# Patient Record
Sex: FEMALE | Race: BLACK OR AFRICAN AMERICAN | NOT HISPANIC OR LATINO | URBAN - METROPOLITAN AREA
[De-identification: names, ages, dates, MRNs, and addresses within clinical notes are randomized per-mention and may not be internally consistent; named-entity substitution may affect disease eponyms.]

---

## 2018-10-01 ENCOUNTER — OUTPATIENT (OUTPATIENT)
Dept: OUTPATIENT SERVICES | Facility: HOSPITAL | Age: 33
LOS: 1 days | End: 2018-10-01
Payer: MEDICAID

## 2018-10-01 PROCEDURE — G9001: CPT

## 2018-10-12 ENCOUNTER — APPOINTMENT (OUTPATIENT)
Dept: OBGYN | Facility: CLINIC | Age: 33
End: 2018-10-12
Payer: MEDICAID

## 2018-10-12 VITALS
SYSTOLIC BLOOD PRESSURE: 112 MMHG | HEIGHT: 67 IN | BODY MASS INDEX: 41.75 KG/M2 | WEIGHT: 266 LBS | DIASTOLIC BLOOD PRESSURE: 76 MMHG

## 2018-10-12 DIAGNOSIS — Z01.419 ENCOUNTER FOR GYNECOLOGICAL EXAMINATION (GENERAL) (ROUTINE) W/OUT ABNORMAL FINDINGS: ICD-10-CM

## 2018-10-12 PROCEDURE — 99395 PREV VISIT EST AGE 18-39: CPT

## 2018-10-15 LAB
ALBUMIN SERPL ELPH-MCNC: 4.1 G/DL
ALP BLD-CCNC: 62 U/L
ALT SERPL-CCNC: 20 U/L
ANION GAP SERPL CALC-SCNC: 9 MMOL/L
AST SERPL-CCNC: 27 U/L
BASOPHILS # BLD AUTO: 0.02 K/UL
BASOPHILS NFR BLD AUTO: 0.5 %
BILIRUB SERPL-MCNC: 0.3 MG/DL
BUN SERPL-MCNC: 4 MG/DL
C TRACH RRNA SPEC QL NAA+PROBE: NOT DETECTED
CALCIUM SERPL-MCNC: 9 MG/DL
CHLORIDE SERPL-SCNC: 103 MMOL/L
CO2 SERPL-SCNC: 25 MMOL/L
CREAT SERPL-MCNC: 0.72 MG/DL
DHEA-S SERPL-MCNC: 107 UG/DL
EOSINOPHIL # BLD AUTO: 0.23 K/UL
EOSINOPHIL NFR BLD AUTO: 5.2 %
ESTRADIOL SERPL-MCNC: 203 PG/ML
FSH SERPL-MCNC: 2.1 IU/L
GLUCOSE SERPL-MCNC: 88 MG/DL
HBA1C MFR BLD HPLC: 5.7 %
HBV SURFACE AG SER QL: NONREACTIVE
HCT VFR BLD CALC: 37.5 %
HCV AB SER QL: NONREACTIVE
HCV S/CO RATIO: 0.06 S/CO
HGB BLD-MCNC: 11.9 G/DL
HIV1+2 AB SPEC QL IA.RAPID: NONREACTIVE
HPV HIGH+LOW RISK DNA PNL CVX: NOT DETECTED
HSV 1+2 IGG SER IA-IMP: NEGATIVE
HSV 1+2 IGG SER IA-IMP: POSITIVE
HSV1 IGG SER QL: 31.1 INDEX
HSV2 IGG SER QL: 0.11 INDEX
IMM GRANULOCYTES NFR BLD AUTO: 0.2 %
INSULIN SERPL-MCNC: 27.6 UU/ML
LH SERPL-ACNC: 7.2 IU/L
LYMPHOCYTES # BLD AUTO: 1.59 K/UL
LYMPHOCYTES NFR BLD AUTO: 36.1 %
MAN DIFF?: NORMAL
MCHC RBC-ENTMCNC: 25.1 PG
MCHC RBC-ENTMCNC: 31.7 GM/DL
MCV RBC AUTO: 78.9 FL
MONOCYTES # BLD AUTO: 0.49 K/UL
MONOCYTES NFR BLD AUTO: 11.1 %
N GONORRHOEA RRNA SPEC QL NAA+PROBE: NOT DETECTED
NEUTROPHILS # BLD AUTO: 2.07 K/UL
NEUTROPHILS NFR BLD AUTO: 46.9 %
PLATELET # BLD AUTO: 313 K/UL
POTASSIUM SERPL-SCNC: 4.2 MMOL/L
PROGEST SERPL-MCNC: 1.1 NG/ML
PROLACTIN SERPL-MCNC: 31.2 NG/ML
PROT SERPL-MCNC: 7.9 G/DL
RBC # BLD: 4.75 M/UL
RBC # FLD: 15.6 %
SODIUM SERPL-SCNC: 137 MMOL/L
SOURCE AMPLIFICATION: NORMAL
SOURCE TP AMPLIFICATION: NORMAL
T PALLIDUM AB SER QL IA: NEGATIVE
T VAGINALIS RRNA SPEC QL NAA+PROBE: NOT DETECTED
T4 FREE SERPL-MCNC: 1.2 NG/DL
TESTOST SERPL-MCNC: 35.6 NG/DL
TSH SERPL-ACNC: 1.94 UIU/ML
WBC # FLD AUTO: 4.41 K/UL

## 2018-10-16 LAB
HSV1 IGM SER QL: NORMAL TITER
HSV2 AB FLD-ACNC: NORMAL TITER

## 2018-10-18 ENCOUNTER — EMERGENCY (EMERGENCY)
Facility: HOSPITAL | Age: 33
LOS: 1 days | Discharge: HOME | End: 2018-10-18
Attending: EMERGENCY MEDICINE | Admitting: EMERGENCY MEDICINE
Payer: MEDICAID

## 2018-10-18 VITALS
RESPIRATION RATE: 20 BRPM | HEART RATE: 84 BPM | TEMPERATURE: 98 F | SYSTOLIC BLOOD PRESSURE: 115 MMHG | DIASTOLIC BLOOD PRESSURE: 69 MMHG | OXYGEN SATURATION: 98 %

## 2018-10-18 VITALS
OXYGEN SATURATION: 100 % | HEART RATE: 77 BPM | SYSTOLIC BLOOD PRESSURE: 109 MMHG | RESPIRATION RATE: 16 BRPM | DIASTOLIC BLOOD PRESSURE: 79 MMHG | TEMPERATURE: 98 F

## 2018-10-18 LAB — CYTOLOGY CVX/VAG DOC THIN PREP: NORMAL

## 2018-10-18 PROCEDURE — 99284 EMERGENCY DEPT VISIT MOD MDM: CPT

## 2018-10-18 NOTE — ED ADULT NURSE NOTE - NSIMPLEMENTINTERV_GEN_ALL_ED
Implemented All Universal Safety Interventions:  [place] to call system. Call bell, personal items and telephone within reach. Instruct patient to call for assistance. Room bathroom lighting operational. Non-slip footwear when patient is off stretcher. Physically safe environment: no spills, clutter or unnecessary equipment. Stretcher in lowest position, wheels locked, appropriate side rails in place.

## 2018-10-18 NOTE — ED ADULT TRIAGE NOTE - CHIEF COMPLAINT QUOTE
Pt c/o dry cough, sinus congestion/rhinitis, sob, wheezing, symptoms on and off x several weeks. No hx Asthma. States was rx'd a ZPack for Pharyngitis x 1 mo ago. Afebrile at present. PMHx PCOS and Epilepsy as a child (not on meds).  Insp/Exp Wheezes auscultated bilat.

## 2018-10-18 NOTE — ED PROVIDER NOTE - NS ED ROS FT
Constitutional: no fevers, no chills.  Eyes: no visual changes.  Ears: no ear drainage, no ear pain.  Nose: +nasal congestion.  Mouth/Throat: no sore throat.  Cardiovascular: no chest pain.  Respiratory: +shortness of breath, +wheezing, +cough  Gastrointestinal: no nausea, no vomiting, no diarrhea, no abdominal pain.  MSK: no flank pain, no back pain.  Genitourinary: no dysuria, no hematuria.  Skin: no rashes.  Neuro: +headache, +dizziness  Psychiatric: no known mental health issues.  '

## 2018-10-18 NOTE — ED PROVIDER NOTE - PHYSICAL EXAMINATION
GEN: Well appearing, well nourished, in no apparent distress.  HEAD: NCAT  HEENT: PERRL, EOMI, no nystagmus, Airway patent, uvula midline, MMM, neck supple, no LAD, no JVD  LUNG: CTAB, no adventitious sounds, no retractions, no nasal flaring  CV: RRR, no murmurs,   Abd: soft, NTND, no rebound or guarding, BS+ in all quadrants, no CVAT  MSK: WWP, Pulses 2+ in extremities, trace b/l pedal edema at ankles, no visible deformities, strength 5/5 in all extremities,   Neuro:  CN II-XII in tact. AAOx3, Ambulatory with stable gait. sensations in tact in all extremities,  neg pronator drift, neg finger to nose, Tremors apparent at rest and with movement mainly in L UE  Skin: Warm and dry, no evidence of rash  Psych: normal mood and affect

## 2018-10-18 NOTE — ED PROVIDER NOTE - ATTENDING CONTRIBUTION TO CARE
AJM: Patient seen with resident and agree with above note. 32F hx pcos on birth control and essential tremors, seizures presents with 1 month of "cold/flu-like symptoms". Originally had cough with nasal congestion. WEnt to urgent care and was prescribed 5 days of z-pack with no improvement. Pt actually endorses a couple of days of worsening sob, wheezing, nausea, fatigue and chills. She also has CP, mild HA and sore throat assoc with coughing. Denies abdominal pain, diarrhea, dysuria, recent travel, recent hospitalization, conjunctivitis. given chronic nature of symptoms and normal VS, likely allergic component. will obtain cxr. if normal will dc home with pmd follow up, xyrtec, flonase. pt and family comfortable with plan.

## 2018-10-18 NOTE — ED ADULT NURSE NOTE - OBJECTIVE STATEMENT
Pt received into room 14 A&Ox4, ambulatory C/O dry cough, sinus congestion/rhinitis, sob, wheezing, symptoms on and off x several weeks. Pt States was prescribed a ZPack for Pharyngitis x 1 mo ago. Breath sounds clear at this time, Pt speaking in full sentences without difficulty, respiration even and unlabored. Md evaluated, VS as noted. Awaiting orders at this time, will continue to monitor for safety and comfort.

## 2018-10-18 NOTE — ED PROVIDER NOTE - OBJECTIVE STATEMENT
32F hx pcos on birth control and essential tremors, seizures presents with 1 month of "cold/flu-like symptoms". Originally had cough with nasal congestion. WEnt to urgent care and was prescribed 5 days of z-pack with no improvement. Pt actually endorses a couple of days of worsening sob, wheezing, nausea, fatigue and chills. She also has CP, mild HA and sore throat assoc with coughing. Denies abdominal pain, diarrhea, dysuria, recent travel, recent hospitalization, conjunctivitis.

## 2018-10-18 NOTE — ED PROVIDER NOTE - MEDICAL DECISION MAKING DETAILS
1 month of cough, r/o pna with cxr. pt does not look toxic so unlikely septic. most likely walking pneumonia vs bronchitis. neuro exam unimpressive for any red flags. 1 month of cough, r/o pna with cxr. pt does not look toxic so unlikely septic. If cxr neg, will send home with flonase and zyrtec as likely seasonal allergies is the cause of her dry cough with post nasal drip, with sinus congestion and HA. neuro exam unimpressive for any red flags.

## 2018-10-18 NOTE — ED PROVIDER NOTE - NSCAREINITIATED _GEN_ER
Problem: Patient Care Overview  Goal: Plan of Care/Patient Progress Review  Outcome: Therapy, progress towards functional goals is fair  Pts VS remain stable on 1/32 off the wall. She remains on IDF bottling for 25 and 17. Brief desats during feedings, self recovered with some pacing. Infant tolerating feeds. Voiding, no stool. Parents not present during shift. Will continue to monitor pt for changes in condition and notify provider as needed.        Zachary Muhammad(Attending)

## 2018-10-19 PROCEDURE — 71046 X-RAY EXAM CHEST 2 VIEWS: CPT | Mod: 26

## 2018-10-22 DIAGNOSIS — Z71.89 OTHER SPECIFIED COUNSELING: ICD-10-CM

## 2018-11-09 ENCOUNTER — APPOINTMENT (OUTPATIENT)
Dept: OBGYN | Facility: CLINIC | Age: 33
End: 2018-11-09
Payer: MEDICAID

## 2018-11-09 VITALS
WEIGHT: 259 LBS | HEIGHT: 67 IN | SYSTOLIC BLOOD PRESSURE: 124 MMHG | DIASTOLIC BLOOD PRESSURE: 70 MMHG | BODY MASS INDEX: 40.65 KG/M2

## 2018-11-09 PROCEDURE — 99213 OFFICE O/P EST LOW 20 MIN: CPT

## 2018-11-10 LAB
ALBUMIN SERPL ELPH-MCNC: 4 G/DL
ALP BLD-CCNC: 49 U/L
ALT SERPL-CCNC: 17 U/L
ANION GAP SERPL CALC-SCNC: 10 MMOL/L
AST SERPL-CCNC: 20 U/L
BILIRUB SERPL-MCNC: 0.3 MG/DL
BUN SERPL-MCNC: 7 MG/DL
CALCIUM SERPL-MCNC: 8.8 MG/DL
CHLORIDE SERPL-SCNC: 108 MMOL/L
CO2 SERPL-SCNC: 22 MMOL/L
CREAT SERPL-MCNC: 0.67 MG/DL
GLUCOSE SERPL-MCNC: 86 MG/DL
POTASSIUM SERPL-SCNC: 4.3 MMOL/L
PROLACTIN SERPL-MCNC: 18.8 NG/ML
PROT SERPL-MCNC: 7.2 G/DL
SODIUM SERPL-SCNC: 140 MMOL/L

## 2018-12-03 ENCOUNTER — OTHER (OUTPATIENT)
Age: 33
End: 2018-12-03

## 2018-12-14 ENCOUNTER — APPOINTMENT (OUTPATIENT)
Dept: OBGYN | Facility: CLINIC | Age: 33
End: 2018-12-14
Payer: MEDICAID

## 2018-12-14 VITALS
HEIGHT: 67 IN | SYSTOLIC BLOOD PRESSURE: 122 MMHG | WEIGHT: 257 LBS | DIASTOLIC BLOOD PRESSURE: 84 MMHG | BODY MASS INDEX: 40.34 KG/M2

## 2018-12-14 VITALS
BODY MASS INDEX: 26.68 KG/M2 | DIASTOLIC BLOOD PRESSURE: 70 MMHG | HEIGHT: 67 IN | SYSTOLIC BLOOD PRESSURE: 120 MMHG | WEIGHT: 170 LBS

## 2018-12-14 DIAGNOSIS — E28.2 POLYCYSTIC OVARIAN SYNDROME: ICD-10-CM

## 2018-12-14 DIAGNOSIS — N92.6 IRREGULAR MENSTRUATION, UNSPECIFIED: ICD-10-CM

## 2018-12-14 DIAGNOSIS — L68.9 HYPERTRICHOSIS, UNSPECIFIED: ICD-10-CM

## 2018-12-14 LAB
HCG UR QL: NEGATIVE
QUALITY CONTROL: YES

## 2018-12-14 PROCEDURE — 99213 OFFICE O/P EST LOW 20 MIN: CPT

## 2018-12-14 PROCEDURE — 81025 URINE PREGNANCY TEST: CPT

## 2018-12-14 RX ORDER — METFORMIN HYDROCHLORIDE 500 MG/1
500 TABLET, COATED ORAL
Qty: 60 | Refills: 0 | Status: ACTIVE | COMMUNITY
Start: 2018-12-14 | End: 1900-01-01

## 2018-12-14 RX ORDER — NORGESTREL AND ETHINYL ESTRADIOL 0.3-0.03MG
0.3-3 KIT ORAL DAILY
Qty: 28 | Refills: 5 | Status: DISCONTINUED | COMMUNITY
Start: 2018-10-12 | End: 2018-12-14

## 2018-12-14 RX ORDER — MEDROXYPROGESTERONE ACETATE 10 MG/1
10 TABLET ORAL DAILY
Qty: 7 | Refills: 0 | Status: ACTIVE | COMMUNITY
Start: 2018-12-14 | End: 1900-01-01

## 2019-01-02 ENCOUNTER — FORM ENCOUNTER (OUTPATIENT)
Age: 34
End: 2019-01-02

## 2024-05-28 ENCOUNTER — INPATIENT (INPATIENT)
Facility: HOSPITAL | Age: 39
LOS: 2 days | Discharge: HOME | End: 2024-05-31
Attending: INTERNAL MEDICINE | Admitting: INTERNAL MEDICINE
Payer: COMMERCIAL

## 2024-05-28 VITALS
DIASTOLIC BLOOD PRESSURE: 77 MMHG | WEIGHT: 205.03 LBS | HEART RATE: 73 BPM | SYSTOLIC BLOOD PRESSURE: 112 MMHG | OXYGEN SATURATION: 100 % | TEMPERATURE: 99 F | RESPIRATION RATE: 22 BRPM | HEIGHT: 66 IN

## 2024-05-28 LAB
BASOPHILS # BLD AUTO: 0.06 K/UL — SIGNIFICANT CHANGE UP (ref 0–0.2)
BASOPHILS NFR BLD AUTO: 0.9 % — SIGNIFICANT CHANGE UP (ref 0–2)
EOSINOPHIL # BLD AUTO: 0.12 K/UL — SIGNIFICANT CHANGE UP (ref 0–0.5)
EOSINOPHIL NFR BLD AUTO: 1.8 % — SIGNIFICANT CHANGE UP (ref 0–6)
HCT VFR BLD CALC: 37.1 % — SIGNIFICANT CHANGE UP (ref 34.5–45)
HGB BLD-MCNC: 12.3 G/DL — SIGNIFICANT CHANGE UP (ref 11.5–15.5)
IMM GRANULOCYTES NFR BLD AUTO: 0.2 % — SIGNIFICANT CHANGE UP (ref 0–0.9)
LYMPHOCYTES # BLD AUTO: 2.11 K/UL — SIGNIFICANT CHANGE UP (ref 1–3.3)
LYMPHOCYTES # BLD AUTO: 32.2 % — SIGNIFICANT CHANGE UP (ref 13–44)
MCHC RBC-ENTMCNC: 27.6 PG — SIGNIFICANT CHANGE UP (ref 27–34)
MCHC RBC-ENTMCNC: 33.2 G/DL — SIGNIFICANT CHANGE UP (ref 32–36)
MCV RBC AUTO: 83.2 FL — SIGNIFICANT CHANGE UP (ref 80–100)
MONOCYTES # BLD AUTO: 0.55 K/UL — SIGNIFICANT CHANGE UP (ref 0–0.9)
MONOCYTES NFR BLD AUTO: 8.4 % — SIGNIFICANT CHANGE UP (ref 2–14)
NEUTROPHILS # BLD AUTO: 3.71 K/UL — SIGNIFICANT CHANGE UP (ref 1.8–7.4)
NEUTROPHILS NFR BLD AUTO: 56.5 % — SIGNIFICANT CHANGE UP (ref 43–77)
NRBC # BLD: 0 /100 WBCS — SIGNIFICANT CHANGE UP (ref 0–0)
PLATELET # BLD AUTO: 214 K/UL — SIGNIFICANT CHANGE UP (ref 150–400)
RBC # BLD: 4.46 M/UL — SIGNIFICANT CHANGE UP (ref 3.8–5.2)
RBC # FLD: 14.3 % — SIGNIFICANT CHANGE UP (ref 10.3–14.5)
WBC # BLD: 6.56 K/UL — SIGNIFICANT CHANGE UP (ref 3.8–10.5)
WBC # FLD AUTO: 6.56 K/UL — SIGNIFICANT CHANGE UP (ref 3.8–10.5)

## 2024-05-28 PROCEDURE — 99285 EMERGENCY DEPT VISIT HI MDM: CPT

## 2024-05-28 RX ORDER — SODIUM CHLORIDE 9 MG/ML
1000 INJECTION INTRAMUSCULAR; INTRAVENOUS; SUBCUTANEOUS ONCE
Refills: 0 | Status: COMPLETED | OUTPATIENT
Start: 2024-05-28 | End: 2024-05-28

## 2024-05-28 RX ORDER — ACETAMINOPHEN 500 MG
975 TABLET ORAL ONCE
Refills: 0 | Status: COMPLETED | OUTPATIENT
Start: 2024-05-28 | End: 2024-05-28

## 2024-05-28 RX ORDER — METOCLOPRAMIDE HCL 10 MG
10 TABLET ORAL ONCE
Refills: 0 | Status: COMPLETED | OUTPATIENT
Start: 2024-05-28 | End: 2024-05-28

## 2024-05-28 RX ADMIN — Medication 10 MILLIGRAM(S): at 23:21

## 2024-05-28 RX ADMIN — Medication 975 MILLIGRAM(S): at 23:51

## 2024-05-28 RX ADMIN — Medication 975 MILLIGRAM(S): at 23:21

## 2024-05-28 RX ADMIN — SODIUM CHLORIDE 2000 MILLILITER(S): 9 INJECTION INTRAMUSCULAR; INTRAVENOUS; SUBCUTANEOUS at 23:21

## 2024-05-28 NOTE — ED ADULT NURSE NOTE - CAS TRG GENERAL AIRWAY, MLM
Encounter addended by: Mallory Velez [facility] [place] on: 2/19/2020 12:42 PM   Actions taken: Clinical Note Signed Constitutional: No fever or chills.   Eyes: No pain, blurry vision, or discharge.  ENMT: No hearing changes, pain, discharge or infections. No neck pain or stiffness.  Cardiac: See HPI  Respiratory: No hemoptysis. No history of asthma or RAD.  GI: No nausea, vomiting, diarrhea or abdominal pain.  : No dysuria, frequency or burning.  MS: No myalgia, muscle weakness, joint pain or back pain.  Neuro: No headache or weakness. No LOC.  Skin: No skin rash.   Endocrine: No history of thyroid disease or diabetes.  Except as documented in the HPI, all other systems are negative. Patent

## 2024-05-28 NOTE — ED ADULT TRIAGE NOTE - PATIENT ON (OXYGEN DELIVERY METHOD)
Patient Specific Counseling (Will Not Stick From Patient To Patient): \\n  Discussed with patient the benign nature of these plaques, given cosmetic concern, information given to [facility] office for consult for laser treatment. Detail Level: Simple room air

## 2024-05-28 NOTE — ED ADULT TRIAGE NOTE - TEMPERATURE IN CELSIUS (DEGREES C)
HC called patients to schedule wellness appointments. Will start scheduling appointments during January.   37.1

## 2024-05-28 NOTE — ED ADULT NURSE NOTE - ED STAT RN HANDOFF DETAILS
[phone] Report endorsed to Ching VANEGAS. Safety checks completed this shift. IV sites checked and remains WDL. Meds given as ordered with no s/s of adverse RXNs. Fall +skin precs in place. Any issues endorsed to oncoming RN for follow up.

## 2024-05-28 NOTE — ED ADULT NURSE NOTE - OBJECTIVE STATEMENT
Pt states that has had headaches for the past 3 months, seen by primary, tried firocet with no relief of headache. PMH of PCOS and anemia. Also endorsing that she passed out 2 weeks ago. Denies fevers, chills, N/V, abdominal pain.

## 2024-05-28 NOTE — ED ADULT TRIAGE NOTE - CHIEF COMPLAINT QUOTE
as per patient " continuous headaches, with blurred vision, dizziness, fainted twice and passed out once in a super market x 1 month, today worsen headache traveling to left ear/left jaw, bilateral hands shaky with nausea." [ hx seizure, PCOS]

## 2024-05-29 LAB
ALBUMIN SERPL ELPH-MCNC: 3.3 G/DL — SIGNIFICANT CHANGE UP (ref 3.3–5)
ALP SERPL-CCNC: 48 U/L — SIGNIFICANT CHANGE UP (ref 40–120)
ALT FLD-CCNC: 15 U/L — SIGNIFICANT CHANGE UP (ref 12–78)
ANION GAP SERPL CALC-SCNC: 7 MMOL/L — SIGNIFICANT CHANGE UP (ref 5–17)
AST SERPL-CCNC: 10 U/L — LOW (ref 15–37)
BILIRUB SERPL-MCNC: 0.1 MG/DL — LOW (ref 0.2–1.2)
BUN SERPL-MCNC: 10 MG/DL — SIGNIFICANT CHANGE UP (ref 7–23)
CALCIUM SERPL-MCNC: 8.6 MG/DL — SIGNIFICANT CHANGE UP (ref 8.5–10.1)
CHLORIDE SERPL-SCNC: 110 MMOL/L — HIGH (ref 96–108)
CO2 SERPL-SCNC: 23 MMOL/L — SIGNIFICANT CHANGE UP (ref 22–31)
CREAT SERPL-MCNC: 0.71 MG/DL — SIGNIFICANT CHANGE UP (ref 0.5–1.3)
EGFR: 112 ML/MIN/1.73M2 — SIGNIFICANT CHANGE UP
GLUCOSE SERPL-MCNC: 91 MG/DL — SIGNIFICANT CHANGE UP (ref 70–99)
HCG SERPL-ACNC: <1 MIU/ML — SIGNIFICANT CHANGE UP
LACTATE SERPL-SCNC: 0.5 MMOL/L — LOW (ref 0.7–2)
MAGNESIUM SERPL-MCNC: 2.1 MG/DL — SIGNIFICANT CHANGE UP (ref 1.6–2.6)
PHOSPHATE SERPL-MCNC: 4.3 MG/DL — SIGNIFICANT CHANGE UP (ref 2.5–4.5)
POTASSIUM SERPL-MCNC: 3.9 MMOL/L — SIGNIFICANT CHANGE UP (ref 3.5–5.3)
POTASSIUM SERPL-SCNC: 3.9 MMOL/L — SIGNIFICANT CHANGE UP (ref 3.5–5.3)
PROT SERPL-MCNC: 7.3 GM/DL — SIGNIFICANT CHANGE UP (ref 6–8.3)
SODIUM SERPL-SCNC: 140 MMOL/L — SIGNIFICANT CHANGE UP (ref 135–145)

## 2024-05-29 PROCEDURE — 70496 CT ANGIOGRAPHY HEAD: CPT | Mod: 26,MC

## 2024-05-29 PROCEDURE — 99222 1ST HOSP IP/OBS MODERATE 55: CPT

## 2024-05-29 PROCEDURE — 70450 CT HEAD/BRAIN W/O DYE: CPT | Mod: 26,59,MC

## 2024-05-29 PROCEDURE — 70498 CT ANGIOGRAPHY NECK: CPT | Mod: 26,MC

## 2024-05-29 RX ORDER — LANOLIN ALCOHOL/MO/W.PET/CERES
3 CREAM (GRAM) TOPICAL AT BEDTIME
Refills: 0 | Status: DISCONTINUED | OUTPATIENT
Start: 2024-05-29 | End: 2024-05-31

## 2024-05-29 RX ORDER — SUMATRIPTAN SUCCINATE 4 MG/.5ML
50 INJECTION, SOLUTION SUBCUTANEOUS ONCE
Refills: 0 | Status: DISCONTINUED | OUTPATIENT
Start: 2024-05-29 | End: 2024-05-29

## 2024-05-29 RX ORDER — KETOROLAC TROMETHAMINE 30 MG/ML
15 SYRINGE (ML) INJECTION EVERY 6 HOURS
Refills: 0 | Status: DISCONTINUED | OUTPATIENT
Start: 2024-05-29 | End: 2024-05-31

## 2024-05-29 RX ORDER — ACETAMINOPHEN 500 MG
650 TABLET ORAL EVERY 6 HOURS
Refills: 0 | Status: DISCONTINUED | OUTPATIENT
Start: 2024-05-29 | End: 2024-05-31

## 2024-05-29 RX ORDER — KETOROLAC TROMETHAMINE 30 MG/ML
15 SYRINGE (ML) INJECTION ONCE
Refills: 0 | Status: DISCONTINUED | OUTPATIENT
Start: 2024-05-29 | End: 2024-05-29

## 2024-05-29 RX ORDER — KETOROLAC TROMETHAMINE 30 MG/ML
30 SYRINGE (ML) INJECTION ONCE
Refills: 0 | Status: DISCONTINUED | OUTPATIENT
Start: 2024-05-29 | End: 2024-05-31

## 2024-05-29 RX ORDER — ONDANSETRON 8 MG/1
4 TABLET, FILM COATED ORAL EVERY 8 HOURS
Refills: 0 | Status: DISCONTINUED | OUTPATIENT
Start: 2024-05-29 | End: 2024-05-31

## 2024-05-29 RX ORDER — DEXAMETHASONE 0.5 MG/5ML
6 ELIXIR ORAL ONCE
Refills: 0 | Status: DISCONTINUED | OUTPATIENT
Start: 2024-05-29 | End: 2024-05-30

## 2024-05-29 RX ORDER — CYCLOBENZAPRINE HYDROCHLORIDE 10 MG/1
5 TABLET, FILM COATED ORAL THREE TIMES A DAY
Refills: 0 | Status: DISCONTINUED | OUTPATIENT
Start: 2024-05-29 | End: 2024-05-31

## 2024-05-29 RX ORDER — MAGNESIUM SULFATE 500 MG/ML
1 VIAL (ML) INJECTION ONCE
Refills: 0 | Status: COMPLETED | OUTPATIENT
Start: 2024-05-29 | End: 2024-05-29

## 2024-05-29 RX ADMIN — Medication 15 MILLIGRAM(S): at 20:43

## 2024-05-29 RX ADMIN — Medication 15 MILLIGRAM(S): at 21:40

## 2024-05-29 RX ADMIN — Medication 15 MILLIGRAM(S): at 04:00

## 2024-05-29 RX ADMIN — Medication 100 GRAM(S): at 20:55

## 2024-05-29 RX ADMIN — Medication 1 TABLET(S): at 04:08

## 2024-05-29 RX ADMIN — Medication 15 MILLIGRAM(S): at 05:37

## 2024-05-29 NOTE — PATIENT PROFILE ADULT - FALL HARM RISK - UNIVERSAL INTERVENTIONS
Bed in lowest position, wheels locked, appropriate side rails in place/Call bell, personal items and telephone in reach/Instruct patient to call for assistance before getting out of bed or chair/Non-slip footwear when patient is out of bed/[place] to call system/Physically safe environment - no spills, clutter or unnecessary equipment/Purposeful Proactive Rounding/Room/bathroom lighting operational, light cord in reach

## 2024-05-29 NOTE — ED PROVIDER NOTE - CRANIAL NERVE AND PUPILLARY EXAM
normal finger to nose/cranial nerves 2-12 intact/central vision intact/extra-ocular movements intact

## 2024-05-29 NOTE — H&P ADULT - NSHPLABSRESULTS_GEN_ALL_CORE
12.3   6.56  )-----------( 214      ( 28 May 2024 23:30 )             37.1     05-28    140  |  110<H>  |  10  ----------------------------<  91  3.9   |  23  |  0.71    Ca    8.6      28 May 2024 23:30  Phos  4.3     05-28  Mg     2.1     05-28    TPro  7.3  /  Alb  3.3  /  TBili  0.1<L>  /  DBili  x   /  AST  10<L>  /  ALT  15  /  AlkPhos  48  05-28      Urinalysis Basic - ( 28 May 2024 23:30 )    Color: x / Appearance: x / SG: x / pH: x  Gluc: 91 mg/dL / Ketone: x  / Bili: x / Urobili: x   Blood: x / Protein: x / Nitrite: x   Leuk Esterase: x / RBC: x / WBC x   Sq Epi: x / Non Sq Epi: x / Bacteria: x      < from: CT Head No Cont (05.29.24 @ 01:47) >    IMPRESSION:  CT Head: No acute intracranial hemorrhage or mass effect.    CTA Neck: No hemodynamically significant stenosis, dissection, or   pseudoaneurysm.    CTA Head: No large vessel occlusion or flow-limiting stenosis.    --- End of Report ---    < end of copied text >

## 2024-05-29 NOTE — H&P ADULT - HISTORY OF PRESENT ILLNESS
38 year old woman hx of seizure disorder (no longer on medications) and PCOS who presents to the c/o persistent unrelieved headache x 1 month. Headaches often accompanied by changes in vision/lightheadedness, described as 10/10 frontal. radiating to the left head, left side of neck and back of head and left earNot relieved by otc meds. Seen by her PMD and had blood work and referred for CT scan.  Patient reports outpatient CT scan was negative.

## 2024-05-29 NOTE — ED PROVIDER NOTE - OBJECTIVE STATEMENT
This patient is a 38 year old woman hx of seizure disorder (no longer on medications) and PCOS who presents to the ER c/o persistent unrelieved headache x 1 month.  Although triage note reports migraine patient has no history of migraines and has never had this kind of pain in the past.  She denies recent head injury or falls.  Patient reports constant frontal headache associated with intermittent episodes of lightheadedness and blurry vision when the pain acutely fluctuates.  She has been taking tylenol and ibuprofen for pain.  She was seen by her PMD and had blood work and referred for CT scan.  Patient reports outpatient CT scan was negative.  She went back to her PCP last week for her results and was instructed to take excedrine with caffeine.  Patient states that her symptoms still have not improved and she still sufferes with constant daily headaches that feels like pressure.  The headache is now reports for the past day headache radiating to the left head, left side of neck and back of head and left ear feeling like "squeezing" pain 10/10 in severity.  She denies vision loss, fever, neck stiffness, focal weakness.   Patient reports 2 episodes of feeling faint and one episode of syncope within this month.  The symptoms would occur when the pain intensity spikes.  She denies chest pain, SOB, and palpitations.  She does report that for the couple weeks she has had intermittent left arm tingling.  No motor deficits.  Patient reports remote history of seizure since childhood that improved as a teenager.  She has not had a seizure or been on medication for about 20 years.

## 2024-05-29 NOTE — ED PROVIDER NOTE - CLINICAL SUMMARY MEDICAL DECISION MAKING FREE TEXT BOX
Persistent unrelenting headache x 1 month now radiating from frontal to left head x 1-2 days.  Patient neurologically intact.  No hx of migraines hx of remote seizures in the past.  Possible tension headache or atypical migraine. R/O Space occupying lesion.  Low suspicion for CVA.  Possible seizures?  Will give medication for pain, check labs, CT and CTA and Re-eval.  Consider admission if pain not controlled. Persistent unrelenting headache x 1 month now radiating from frontal to left head x 1-2 days.  Patient neurologically intact.  No hx of migraines hx of remote seizures in the past.  Possible tension headache or atypical migraine. R/O Space occupying lesion.  Low suspicion for CVA.  Possible seizures?  Will give medication for pain, check labs, CT and CTA and Re-eval.  Consider admission if pain not controlled.    CT/CTA negative for acute pathology.  Pain not controlled in the ER despite medication.  Patient admitted.

## 2024-05-29 NOTE — CONSULT NOTE ADULT - SUBJECTIVE AND OBJECTIVE BOX
Neurology consult    MANDIE [id number]     Patient is a 38y old  Female who presents with a chief complaint of intractable headache (29 May 2024 10:24)      HPI:  38 year old woman hx of seizure disorder (no longer on medications) and PCOS who presents to the c/o persistent unrelieved headache x 1 month. Headaches often accompanied by changes in vision/lightheadedness, described as 10/10 frontal. radiating to the left head, left side of neck and back of head and left earNot relieved by otc meds. Seen by her PMD and had blood work and referred for CT scan.  Patient reports outpatient CT scan was negative.  (29 May 2024 10:24)    + photophobia tingling down the left arm    no difficulty with language.  No vision loss or double vision.  No dizziness, vertigo or new hearing loss.  . No difficulty with swallowing.  No focal weakness.  No focal sensory changes.  No numbness or tingling in the bilateral lower extremities.  No difficulty with balance.  No difficulty with ambulation.    REVIEW OF SYSTEMS:    Constitutional: No fever, chills, fatigue, weakness  Eyes: no eye pain, visual disturbances, or discharge  ENT:  No difficulty hearing, tinnitus, vertigo; No sinus or throat pain  Neck: No pain or stiffness  Respiratory: No cough, dyspnea, wheezing   Cardiovascular: No chest pain, palpitations,   Gastrointestinal: No abdominal or epigastric pain. No nausea, vomiting  No diarrhea or constipation.   Genitourinary: No dysuria, frequency, hematuria or incontinence  Neurological: No headaches, lightheadedness, vertigo, numbness or tremors  Psychiatric: No depression, anxiety, mood swings or difficulty sleeping  Musculoskeletal: No joint pain or swelling; No muscle, back or extremity pain  Skin: No itching, burning, rashes or lesions   Lymph Nodes: No enlarged glands  Endocrine: No heat or cold intolerance; No hair loss, No h/o diabetes or thyroid dysfunction  Allergy and Immunologic: No hives or eczema    MEDICATIONS    acetaminophen     Tablet .. 650 milliGRAM(s) Oral every 6 hours PRN  aluminum hydroxide/magnesium hydroxide/simethicone Suspension 30 milliLiter(s) Oral every 4 hours PRN  melatonin 3 milliGRAM(s) Oral at bedtime PRN  ondansetron Injectable 4 milliGRAM(s) IV Push every 8 hours PRN  SUMAtriptan 50 milliGRAM(s) Oral once      PMH: PCOS (polycystic ovarian syndrome)    Seizure         PSH: No significant past surgical history        Family history: No history of dementia, strokes, or seizures   FAMILY HISTORY:  No pertinent family history in first degree relatives        SOCIAL HISTORY:  No history of tobacco or alcohol use     Allergies    No Known Drug Allergies  citrus (Anaphylaxis)    Intolerances        Height (cm): 167.6 (05-28 @ 21:42)  Weight (kg): 93 (05-28 @ 21:42)  BMI (kg/m2): 33.1 (05-28 @ 21:42)    Vital Signs Last 24 Hrs  T(C): 36.8 (29 May 2024 07:56), Max: 37.1 (28 May 2024 21:42)  T(F): 98.2 (29 May 2024 07:56), Max: 98.8 (28 May 2024 21:42)  HR: 68 (29 May 2024 07:56) (68 - 73)  BP: 100/67 (29 May 2024 07:56) (92/59 - 112/77)  BP(mean): 70 (29 May 2024 03:57) (70 - 82)  RR: 18 (29 May 2024 07:56) (14 - 22)  SpO2: 98% (29 May 2024 07:56) (98% - 100%)    Parameters below as of 29 May 2024 06:51  Patient On (Oxygen Delivery Method): room air          On Neurological Examination:    Mental Status - Patient is alert, awake, oriented X3. fluent, names, no dysarthria no aphasia Follows commands well and able to answer questions appropriately. Mood and affect  normal    Cranial Nerves - PERRL, EOMI, VFF, V1-V3 intact, no gross facial asymmetry, tongue/uvula midline    Motor Exam -   Right upper  Left upper  Right lower  Left lower      nml bulk/tone    Sensory    Intact to light touch and pinprick bilaterally    Coord: FTN intact bilaterally     Gait -  normal      Reflexes:          2+ throughout                                               GENERAL Exam:     Nontoxic , No Acute Distress   	  HEENT:  normocephalic, atraumatic  		  LUNGS:	Clear bilaterally  No Wheeze    	  HEART:	 Normal S1S2   No murmur RRR        	  GI/ ABDOMEN:  Soft  Non tender    EXTREMITIES:   No Edema  No Clubbing  No Cyanosis No Edema    MUSCULOSKELETAL: Normal Range of Motion  	   SKIN:      Normal   No Ecchymosis               LABS:  CBC Full  -  ( 28 May 2024 23:30 )  WBC Count : 6.56 K/uL  RBC Count : 4.46 M/uL  Hemoglobin : 12.3 g/dL  Hematocrit : 37.1 %  Platelet Count - Automated : 214 K/uL  Mean Cell Volume : 83.2 fl  Mean Cell Hemoglobin : 27.6 pg  Mean Cell Hemoglobin Concentration : 33.2 g/dL  Auto Neutrophil # : 3.71 K/uL  Auto Lymphocyte # : 2.11 K/uL  Auto Monocyte # : 0.55 K/uL  Auto Eosinophil # : 0.12 K/uL  Auto Basophil # : 0.06 K/uL  Auto Neutrophil % : 56.5 %  Auto Lymphocyte % : 32.2 %  Auto Monocyte % : 8.4 %  Auto Eosinophil % : 1.8 %  Auto Basophil % : 0.9 %    Urinalysis Basic - ( 28 May 2024 23:30 )    Color: x / Appearance: x / SG: x / pH: x  Gluc: 91 mg/dL / Ketone: x  / Bili: x / Urobili: x   Blood: x / Protein: x / Nitrite: x   Leuk Esterase: x / RBC: x / WBC x   Sq Epi: x / Non Sq Epi: x / Bacteria: x      05-28    140  |  110<H>  |  10  ----------------------------<  91  3.9   |  23  |  0.71    Ca    8.6      28 May 2024 23:30  Phos  4.3     05-28  Mg     2.1     05-28    TPro  7.3  /  Alb  3.3  /  TBili  0.1<L>  /  DBili  x   /  AST  10<L>  /  ALT  15  /  AlkPhos  48  05-28    LIVER FUNCTIONS - ( 28 May 2024 23:30 )  Alb: 3.3 g/dL / Pro: 7.3 gm/dL / ALK PHOS: 48 U/L / ALT: 15 U/L / AST: 10 U/L / GGT: x           Hemoglobin A1C:             RADIOLOGY  < from: CT Angio Neck w/ IV Cont (05.29.24 @ 01:50) >  IMPRESSION:  CT Head: No acute intracranial hemorrhage or mass effect.    CTA Neck: No hemodynamically significant stenosis, dissection, or   pseudoaneurysm.    CTA Head: No large vessel occlusion or flow-limiting stenosis.    --- End of Report ---      < end of copied text >

## 2024-05-29 NOTE — H&P ADULT - ASSESSMENT
38 year old woman hx of seizure disorder (no longer on medications) and PCOS who presents to the c/o persistent unrelieved headache x 1 month. Headaches often accompanied by changes in vision/lightheadedness, described as 10/10 frontal. radiating to the left head, left side of neck and back of head and left earNot relieved by otc meds. Seen by her PMD and had blood work and referred for CT scan.  Patient reports outpatient CT scan was negative.     Intractable headache  CT in ed negative  will get MRI  trial of triptan  Neuro consult placed

## 2024-05-29 NOTE — ED PROVIDER NOTE - CPE EDP EYES NORM
VSS. Pain well controlled with PRN pain medication. C/s dressing remains dry and intact. Pt ambulating independently and voiding without difficulty. Patient safety maintained, side rails up, bed low and locked position.  Patient responding to infant cues. Significant other at bedside and assisting in patient's care. Will continue to round as needed.   normal...

## 2024-05-29 NOTE — H&P ADULT - NSHPREVIEWOFSYSTEMS_GEN_ALL_CORE
CONSTITUTIONAL: No fever, weight loss, or fatigue  EYES: No eye pain, visual disturbances, or discharge  ENMT:  No difficulty hearing, tinnitus, vertigo; No sinus or throat pain  NECK: No pain or stiffness  BREASTS: No pain, masses, or nipple discharge  RESPIRATORY: No cough, wheezing, chills or hemoptysis; No shortness of breath  CARDIOVASCULAR: No chest pain, palpitations, dizziness, or leg swelling  GASTROINTESTINAL: No abdominal or epigastric pain. No nausea, vomiting, or hematemesis; No diarrhea or constipation. No melena or hematochezia.  GENITOURINARY: No dysuria, frequency, hematuria, or incontinence  NEUROLOGICAL: see hpi  SKIN: No itching, burning, rashes, or lesions   LYMPH NODES: No enlarged glands  ENDOCRINE: No heat or cold intolerance; No hair loss  MUSCULOSKELETAL: No joint pain or swelling; No muscle, back, or extremity pain  PSYCHIATRIC: No depression, anxiety, mood swings, or difficulty sleeping  HEME/LYMPH: No easy bruising, or bleeding gums  ALLERGY AND IMMUNOLOGIC: No hives or eczema

## 2024-05-29 NOTE — ED PROVIDER NOTE - CROS ED CONS ALL NEG
Chief Complaint   Patient presents with   • Altered Mental Status     hosptial encounter        Subjective   Tye JONES Junior is a 47 y.o. male.     History of Present Illness   Hospital follow up long and complicated course for this pt. Admitted 12/22/19-1/6/20 at which time he was d/c to PM&R for further treatment. Says he is feeling well home since 1/24/20.  Pt was not feeling well right before [place] initially attributed to his father dying. However when sister returned from the services noted her pt was not in his usually state of health in the way he appeared or behaved and he urinated on himself which was very uncharacteristic of pt so took him to ED. 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 He had surgery to remove and replace his  shunt during admission.   He also was seen by ID for determining the abx course and duration.  He also had status epilepticus in the hospital determined after placing him with EEG monitoring. Tried a different medications from his usual with increasing doses so now on new regimen here that is working. He was started on vimpat, clonazepam and topomax and the keppra was increased. Also on phenytoin.   Working on ADLs and now going to start with PT.  He had TARA thought to be related to abx use.  Keppra at 7 am, phenytoin then too this morning.   He is anxious to get back into his adult day program. He is getting around better at this time with the walker.   He does get restless at bedtime. Thinks the hydroxyzine helps to some extent with this. Has some urinary frequency but no longer having incontinence. This has resolved now that he is moving much better.      The following portions of the patient's history were reviewed and updated as appropriate: allergies, current medications, past medical history, past social history, past surgical history and problem  "list.    Review of Systems   Respiratory: Negative.    Cardiovascular: Negative.    Neurological: Negative.        /72 (BP Location: Left arm, Patient Position: Sitting, Cuff Size: Adult)   Pulse 87   Temp 97.9 °F (36.6 °C) (Oral)   Resp 13   Ht 182.9 cm (72\")   Wt 83.6 kg (184 lb 6.4 oz)   SpO2 98%   BMI 25.01 kg/m²       Objective   Physical Exam   Constitutional: He is oriented to person, place, and time. He appears well-nourished. No distress.   Eyes: Conjunctivae are normal. Right eye exhibits no discharge. Left eye exhibits no discharge. No scleral icterus.   Cardiovascular: Normal rate, regular rhythm, normal heart sounds and intact distal pulses. Exam reveals no gallop and no friction rub.   No murmur heard.  Pulmonary/Chest: Effort normal and breath sounds normal. No respiratory distress. He has no wheezes.   Musculoskeletal: He exhibits no edema.   Neurological: He is alert and oriented to person, place, and time.   Psychiatric: He has a normal mood and affect. His behavior is normal.   Vitals reviewed.      Assessment/Plan   Tye was seen today for altered mental status.    Diagnoses and all orders for this visit:    Seizure (CMS/HCC)  -     CBC & Differential  -     Comprehensive Metabolic Panel  -     Phenytoin Level, Total  -     Levetiracetam Level (Keppra)    History of traumatic brain injury  -     amphetamine-dextroamphetamine (ADDERALL) 30 MG tablet; Take 1 tablet by mouth Daily.    Medication management  -     CBC & Differential  -     Comprehensive Metabolic Panel  -     Phenytoin Level, Total  -     Levetiracetam Level (Keppra)    Other orders  -     potassium chloride (MICRO-K) 10 MEQ CR capsule; Take 1 capsule by mouth Daily.  -     hydrOXYzine pamoate (VISTARIL) 50 MG capsule; Take 1 capsule by mouth Every Night. For sleep  -     nystatin (MYCOSTATIN) 689090 UNIT/GM powder; Apply 1 application topically to the appropriate area as directed Every 12 (Twelve) Hours.      He is " negative... improving still after coming home. He is getting better on his feet  And getting stronger in his legs again. We discussed deconditioning with as much time as he spent in the hospital being down.   We will follow up on his anemia, platelet counts, kidney and liver labs here and he is set to see his other providers soon so will have the drug levels drawn at this time since he is here and getting stuck.    The hydroxyzine is helping at least some so we will continue.     Current outpatient and discharge medications have been reconciled for the patient.  Reviewed by: Jolene Laurent MD  Reviewed notes, labs and imaging reports..

## 2024-05-29 NOTE — H&P ADULT - NSHPPHYSICALEXAM_GEN_ALL_CORE
Vital Signs Last 24 Hrs  T(C): 36.8 (29 May 2024 07:56), Max: 37.1 (28 May 2024 21:42)  T(F): 98.2 (29 May 2024 07:56), Max: 98.8 (28 May 2024 21:42)  HR: 68 (29 May 2024 07:56) (68 - 73)  BP: 100/67 (29 May 2024 07:56) (92/59 - 112/77)  BP(mean): 70 (29 May 2024 03:57) (70 - 82)  RR: 18 (29 May 2024 07:56) (14 - 22)  SpO2: 98% (29 May 2024 07:56) (98% - 100%)    Parameters below as of 29 May 2024 06:51  Patient On (Oxygen Delivery Method): room air    GENERAL: NAD, well-groomed, well-developed  HEAD:  Atraumatic, Normocephalic  EYES: EOMI, PERRLA, conjunctiva and sclera clear  ENMT: No tonsillar erythema, exudates, or enlargement; Moist mucous membranes, Good dentition, No lesions  NECK: Supple, No JVD, Normal thyroid  NERVOUS SYSTEM:  Alert & Oriented X3, Good concentration; Motor Strength 5/5 B/L upper and lower extremities; DTRs 2+ intact and symmetric  CHEST/LUNG: Clear to percussion bilaterally; No rales, rhonchi, wheezing, or rubs  HEART: Regular rate and rhythm; No murmurs, rubs, or gallops  ABDOMEN: Soft, Nontender, Nondistended; Bowel sounds present  EXTREMITIES:  2+ Peripheral Pulses, No clubbing, cyanosis, or edema  LYMPH: No lymphadenopathy   SKIN: No rashes or lesions

## 2024-05-29 NOTE — CONSULT NOTE ADULT - ASSESSMENT
38 year old woman hx of seizure disorder (no longer on medications) and PCOS who presents to the c/o persistent unrelieved headache x 1 month. frontal, left occcipital, tingling down left arm  PE non-focal, left cervical spasm. CTH CTA H/N -    Impression: HA with cervicogenic and Migranous features    MRI Brain and C-spine  Decadron 6 mg IV x1  Toraldol 30 mg x1 followed by PRN  Muscle relaxants PRN  wm packs to neck  Magnesium 1 g IV x 1

## 2024-05-29 NOTE — PATIENT PROFILE ADULT - FUNCTIONAL ASSESSMENT - BASIC MOBILITY 6.
4-calculated by average/Not able to assess (calculate score using [facility] averaging method)

## 2024-05-29 NOTE — PATIENT PROFILE ADULT - FUNCTIONAL ASSESSMENT - BASIC MOBILITY ASSESSMENT TYPE
Debility    Consulting PT/OT for recommendations---initially recommended SNF. It is noted that the patient is ambulating above 100 ft and will not be approved for SNF care. The patient will be considered for NH placement.   Admission

## 2024-05-30 PROCEDURE — 72156 MRI NECK SPINE W/O & W/DYE: CPT | Mod: 26

## 2024-05-30 PROCEDURE — 99232 SBSQ HOSP IP/OBS MODERATE 35: CPT

## 2024-05-30 PROCEDURE — 70553 MRI BRAIN STEM W/O & W/DYE: CPT | Mod: 26

## 2024-05-30 RX ORDER — DEXAMETHASONE 0.5 MG/5ML
6 ELIXIR ORAL ONCE
Refills: 0 | Status: DISCONTINUED | OUTPATIENT
Start: 2024-05-30 | End: 2024-05-31

## 2024-05-30 RX ADMIN — Medication 15 MILLIGRAM(S): at 18:57

## 2024-05-30 NOTE — PROGRESS NOTE ADULT - SUBJECTIVE AND OBJECTIVE BOX
Patient is a 38y old  Female who presents with a chief complaint of intractable headache (29 May 2024 12:48)    INTERVAL HPI/OVERNIGHT EVENTS:    MEDICATIONS  (STANDING):  dexAMETHasone  Injectable 6 milliGRAM(s) IV Push once  ketorolac   Injectable 30 milliGRAM(s) IV Push once    MEDICATIONS  (PRN):  acetaminophen     Tablet .. 650 milliGRAM(s) Oral every 6 hours PRN Temp greater or equal to 38C (100.4F), Mild Pain (1 - 3)  aluminum hydroxide/magnesium hydroxide/simethicone Suspension 30 milliLiter(s) Oral every 4 hours PRN Dyspepsia  cyclobenzaprine 5 milliGRAM(s) Oral three times a day PRN Muscle Spasm  ketorolac   Injectable 15 milliGRAM(s) IV Push every 6 hours PRN Severe Pain (7 - 10)  melatonin 3 milliGRAM(s) Oral at bedtime PRN Insomnia  ondansetron Injectable 4 milliGRAM(s) IV Push every 8 hours PRN Nausea and/or Vomiting    Allergies    No Known Drug Allergies  citrus (Anaphylaxis)    Intolerances      REVIEW OF SYSTEMS:  All other systems reviewed and are negative    Vital Signs Last 24 Hrs  T(C): 37.2 (30 May 2024 16:57), Max: 37.3 (30 May 2024 11:20)  T(F): 98.9 (30 May 2024 16:57), Max: 99.2 (30 May 2024 11:20)  HR: 70 (30 May 2024 16:57) (64 - 86)  BP: 97/64 (30 May 2024 16:57) (96/65 - 104/69)  BP(mean): --  RR: 18 (30 May 2024 16:57) (18 - 18)  SpO2: 98% (30 May 2024 16:57) (98% - 99%)    Parameters below as of 30 May 2024 16:57  Patient On (Oxygen Delivery Method): room air      Daily     Daily Weight in k.6 (30 May 2024 05:04)  I&O's Summary    29 May 2024 07:01  -  30 May 2024 07:00  --------------------------------------------------------  IN: 680 mL / OUT: 0 mL / NET: 680 mL      CAPILLARY BLOOD GLUCOSE        PHYSICAL EXAM:  GENERAL: NAD,    HEAD:  Atraumatic, Normocephalic  EYES: EOMI, PERRLA, conjunctiva and sclera clear  ENMT: No tonsillar erythema, exudates, or enlargement; Moist mucous membranes, Good dentition, No lesions  NECK: Supple, No JVD, Normal thyroid  NERVOUS SYSTEM:  Alert & Oriented X3, Good concentration; Motor Strength 5/5 B/L upper and lower extremities; DTRs 2+ intact and symmetric  CHEST/LUNG: Clear to percussion bilaterally; No rales, rhonchi, wheezing, or rubs  HEART: Regular rate and rhythm; No murmurs, rubs, or gallops  ABDOMEN: Soft, Nontender, Nondistended; Bowel sounds present  EXTREMITIES:  2+ Peripheral Pulses, No clubbing, cyanosis, or edema  LYMPH: No lymphadenopathy noted  SKIN: No rashes or lesions    Labs                          12.3   6.56  )-----------( 214      ( 28 May 2024 23:30 )             37.1         140  |  110<H>  |  10  ----------------------------<  91  3.9   |  23  |  0.71    Ca    8.6      28 May 2024 23:30  Phos  4.3       Mg     2.1         TPro  7.3  /  Alb  3.3  /  TBili  0.1<L>  /  DBili  x   /  AST  10<L>  /  ALT  15  /  AlkPhos  48            Urinalysis Basic - ( 28 May 2024 23:30 )    Color: x / Appearance: x / SG: x / pH: x  Gluc: 91 mg/dL / Ketone: x  / Bili: x / Urobili: x   Blood: x / Protein: x / Nitrite: x   Leuk Esterase: x / RBC: x / WBC x   Sq Epi: x / Non Sq Epi: x / Bacteria: x                  DVT prophylaxis: > Lovenox 40mg SQ daily  > Heparin   > SCD's

## 2024-05-30 NOTE — PROGRESS NOTE ADULT - ASSESSMENT
38 year old woman hx of seizure disorder (no longer on medications) and PCOS who presents to the c/o persistent unrelieved headache x 1 month. Headaches often accompanied by changes in vision/lightheadedness, described as 10/10 frontal. radiating to the left head, left side of neck and back of head and left earNot relieved by otc meds. Seen by her PMD and had blood work and referred for CT scan.  Patient reports outpatient CT scan was negative.     Intractable headache  CT in ed negative   MRI     Neuro consult on board

## 2024-05-31 ENCOUNTER — TRANSCRIPTION ENCOUNTER (OUTPATIENT)
Age: 39
End: 2024-05-31

## 2024-05-31 VITALS
DIASTOLIC BLOOD PRESSURE: 63 MMHG | SYSTOLIC BLOOD PRESSURE: 103 MMHG | OXYGEN SATURATION: 99 % | TEMPERATURE: 99 F | RESPIRATION RATE: 17 BRPM | HEART RATE: 59 BPM

## 2024-05-31 PROCEDURE — 99239 HOSP IP/OBS DSCHRG MGMT >30: CPT

## 2024-05-31 RX ADMIN — Medication 15 MILLIGRAM(S): at 11:55

## 2024-05-31 RX ADMIN — Medication 15 MILLIGRAM(S): at 12:52

## 2024-05-31 RX ADMIN — Medication 15 MILLIGRAM(S): at 06:10

## 2024-05-31 RX ADMIN — Medication 15 MILLIGRAM(S): at 05:19

## 2024-05-31 NOTE — DISCHARGE NOTE NURSING/CASE MANAGEMENT/SOCIAL WORK - NSDCVIVACCINE_GEN_ALL_CORE_FT
Problem: Discharge Planning  Goal: Discharge to home or other facility with appropriate resources  Outcome: Progressing  Flowsheets (Taken 11/28/2023 0836 by Marly Severino RN)  Discharge to home or other facility with appropriate resources:   Identify barriers to discharge with patient and caregiver   Arrange for needed discharge resources and transportation as appropriate   Identify discharge learning needs (meds, wound care, etc)   Arrange for interpreters to assist at discharge as needed   Refer to discharge planning if patient needs post-hospital services based on physician order or complex needs related to functional status, cognitive ability or social support system     Problem: Pain  Goal: Verbalizes/displays adequate comfort level or baseline comfort level  Outcome: Progressing  Flowsheets (Taken 11/28/2023 0836 by Marly Severino RN)  Verbalizes/displays adequate comfort level or baseline comfort level:   Encourage patient to monitor pain and request assistance   Assess pain using appropriate pain scale   Administer analgesics based on type and severity of pain and evaluate response   Implement non-pharmacological measures as appropriate and evaluate response   Consider cultural and social influences on pain and pain management   Notify Licensed Independent Practitioner if interventions unsuccessful or patient reports new pain     Problem: Skin/Tissue Integrity  Goal: Absence of new skin breakdown  Description: 1. Monitor for areas of redness and/or skin breakdown  2. Assess vascular access sites hourly  3. Every 4-6 hours minimum:  Change oxygen saturation probe site  4. Every 4-6 hours:  If on nasal continuous positive airway pressure, respiratory therapy assess nares and determine need for appliance change or resting period.   Outcome: Progressing     Problem: Safety - Adult  Goal: Free from fall injury  Outcome: Progressing  Flowsheets (Taken 11/18/2023 1526 by Santi Young LPN)  Free From No Vaccines Administered.

## 2024-05-31 NOTE — DISCHARGE NOTE NURSING/CASE MANAGEMENT/SOCIAL WORK - PATIENT PORTAL LINK FT
You can access the FollowMyHealth Patient Portal offered by [facility] by registering at the following website: [email or web address]. By joining [facility]’s FollowMyHealth portal, you will also be able to view your health information using other applications (apps) compatible with our system.

## 2024-05-31 NOTE — PROGRESS NOTE ADULT - SUBJECTIVE AND OBJECTIVE BOX
Patient seen and examined this am. No new events    MEDICATIONS:    acetaminophen     Tablet .. 650 milliGRAM(s) Oral every 6 hours PRN  aluminum hydroxide/magnesium hydroxide/simethicone Suspension 30 milliLiter(s) Oral every 4 hours PRN  cyclobenzaprine 5 milliGRAM(s) Oral three times a day PRN  dexAMETHasone  Injectable 6 milliGRAM(s) IV Push once  ketorolac   Injectable 30 milliGRAM(s) IV Push once  ketorolac   Injectable 15 milliGRAM(s) IV Push every 6 hours PRN  melatonin 3 milliGRAM(s) Oral at bedtime PRN  ondansetron Injectable 4 milliGRAM(s) IV Push every 8 hours PRN      LABS:            CAPILLARY BLOOD GLUCOSE            I&O's Summary    30 May 2024 07:01  -  31 May 2024 07:00  --------------------------------------------------------  IN: 480 mL / OUT: 0 mL / NET: 480 mL      Vital Signs Last 24 Hrs  T(C): 37.2 (31 May 2024 05:04), Max: 37.3 (30 May 2024 11:20)  T(F): 98.9 (31 May 2024 05:04), Max: 99.2 (30 May 2024 11:20)  HR: 61 (31 May 2024 05:04) (61 - 78)  BP: 95/61 (31 May 2024 05:04) (95/61 - 104/69)  BP(mean): --  RR: 16 (31 May 2024 05:04) (16 - 18)  SpO2: 99% (31 May 2024 05:04) (98% - 99%)    Parameters below as of 31 May 2024 05:04  Patient On (Oxygen Delivery Method): room air                On Neurological Examination:    Mental Status - Patient is alert, awake, oriented X3. fluent, names, no dysarthria no aphasia Follows commands well and able to answer questions appropriately. Mood and affect  normal    Cranial Nerves - PERRL, EOMI, VFF, V1-V3 intact, no gross facial asymmetry, tongue/uvula midline    Motor Exam -   5/5    Sensory    Intact to light touch and pinprick bilaterally    Coord: FTN intact bilaterally     Gait -  normal      Reflexes:          2+ throughout                                               GENERAL Exam:     Nontoxic , No Acute Distress   	  HEENT:  normocephalic, atraumatic  		  LUNGS:	Clear bilaterally  No Wheeze    	  HEART:	 Normal S1S2   No murmur RRR        	  GI/ ABDOMEN:  Soft  Non tender    EXTREMITIES:   No Edema  No Clubbing  No Cyanosis No Edema    MUSCULOSKELETAL: Normal Range of Motion  	   SKIN:      Normal   No Ecchymosis                     RADIOLOGY  < from: CT Angio Neck w/ IV Cont (05.29.24 @ 01:50) >  IMPRESSION:  CT Head: No acute intracranial hemorrhage or mass effect.    CTA Neck: No hemodynamically significant stenosis, dissection, or   pseudoaneurysm.    CTA Head: No large vessel occlusion or flow-limiting stenosis.    --- End of Report ---      < end of copied text >        < from: MR Head w/wo IV Cont (05.30.24 @ 09:43) >  MPRESSION:    No acute intracranial pathology or abnormal enhancement.    Nonspecific minimal scattered foci of white matter signal abnormality   which may be seen with chronic microvascular ischemic changes,   demyelinating disease, migraine, or sequelae of remote   inflammation/infection.    < end of copied text >  < from: MR Cervical Spine w/wo IV Cont (05.30.24 @ 09:43) >  IMPRESSION:    No cervical cord compression or cord signal abnormality.    Multilevel degenerative changes resulting in mild spinal and   neuroforaminal stenosis as above, morenotable at C6-7 with left   paracentral disc protrusion abutting the left ventral cord.    Mildly enlarged bilateral level 2B lymph nodes measuring up to 1.1 cm in   the right, nonspecific in etiology.    --- End of Report ---        < end of copied text >

## 2024-05-31 NOTE — PROGRESS NOTE ADULT - ASSESSMENT
38 year old woman hx of seizure disorder (no longer on medications) and PCOS who presents to the c/o persistent unrelieved headache x 1 month. frontal, left occcipital, tingling down left arm  PE non-focal, left cervical spasm. CTH CTA H/N -  MRI brain non-specific  MRI C_-spine Multilevel degenerative changes resulting in mild spinal and   neuroforaminal stenosis as above, morenotable at C6-7 with left   paracentral disc protrusion abutting the left ventral cord.      Impression: HA with cervicogenic and Migranous features    HA improving  Decadron 6 mg IV x1  DC on medrol frederic  outpt EMG  Can follow up with Neurology, Dr. Stu Sahu at [phone]

## 2024-05-31 NOTE — DISCHARGE NOTE PROVIDER - NSDCCPCAREPLAN_GEN_ALL_CORE_FT
PRINCIPAL DISCHARGE DIAGNOSIS  Diagnosis: Migraine with status migrainosus  Assessment and Plan of Treatment: follow up with neurology   medrol dose pack as prescribed      SECONDARY DISCHARGE DIAGNOSES  Diagnosis: Intractable headache  Assessment and Plan of Treatment: Imaging results to follow up with yoru PCP/neuro  < end of copied text >  IMPRESSION:  CT Head: No acute intracranial hemorrhage or mass effect.  CTA Neck: No hemodynamically significant stenosis, dissection, or   pseudoaneurysm.  CTA Head: No large vessel occlusion or flow-limiting stenosis.  --- End of Report ---  < from: CT Head No Cont (05.29.24 @ 01:47) >   MRI :< from: MR Head w/wo IV Cont (05.30.24 @ 09:43) >  IMPRESSION:  No acute intracranial pathology or abnormal enhancement.  Nonspecific minimal scattered foci of white matter signal abnormality   which may be seen with chronic microvascular ischemic changes,   demyelinating disease, migraine, or sequelae of remote   inflammation/infection.  --- End of Report ---  < end of copied text >  < from: MR Cervical Spine w/wo IV Cont (05.30.24 @ 09:43) >  IMPRESSION:  No cervical cord compression or cord signal abnormality.  Multilevel degenerative changes resulting in mild spinal and   neuroforaminal stenosis as above, morenotable at C6-7 with left   paracentral disc protrusion abutting the left ventral cord.  Mildly enlarged bilateral level 2B lymph nodes measuring up to 1.1 cm in   the right, nonspecific in etiology.  --- End of Report ---

## 2024-05-31 NOTE — DISCHARGE NOTE PROVIDER - NSDCMRMEDTOKEN_GEN_ALL_CORE_FT
Medrol Dosepak 4 mg oral tablet: 1 tab(s) orally once a day  naproxen 500 mg oral tablet: 1 tab(s) orally every 12 hours as needed for  headache

## 2024-05-31 NOTE — DISCHARGE NOTE PROVIDER - HOSPITAL COURSE
38 year old woman hx of seizure disorder (no longer on medications) and PCOS who presents to the c/o persistent unrelieved headache x 1 month. Headaches often accompanied by changes in vision/lightheadedness, described as 10/10 frontal. radiating to the left head, left side of neck and back of head and left earNot relieved by otc meds. Seen by her PMD and had blood work and referred for CT scan.  Patient reports outpatient CT scan was negative.     Intractable headache  CT in ed negative  was declining steroids, stable for outpt neuro follow up, encouraged to try medrol dose pack for status migranosus     MRI :< from: MR Head w/wo IV Cont (05.30.24 @ 09:43) >    IMPRESSION:    No acute intracranial pathology or abnormal enhancement.    Nonspecific minimal scattered foci of white matter signal abnormality   which may be seen with chronic microvascular ischemic changes,   demyelinating disease, migraine, or sequelae of remote   inflammation/infection.    --- End of Report ---              < end of copied text >    < from: MR Cervical Spine w/wo IV Cont (05.30.24 @ 09:43) >      IMPRESSION:    No cervical cord compression or cord signal abnormality.    Multilevel degenerative changes resulting in mild spinal and   neuroforaminal stenosis as above, morenotable at C6-7 with left   paracentral disc protrusion abutting the left ventral cord.    Mildly enlarged bilateral level 2B lymph nodes measuring up to 1.1 cm in   the right, nonspecific in etiology.    --- End of Report ---      < end of copied text >         Neuro consult on board      pt seen and examined 45 min spent on dc planning     Lab test review, Radiology Review, Vitals review, Consultant review and discussion, Physical examination, IDR, Assessment and plan; Plan discussion with patient and family

## 2024-05-31 NOTE — DISCHARGE NOTE PROVIDER - CARE PROVIDER_API CALL
tSu Sahu)  Neurology  [address], [address]  [place] [address]  Phone: [phone]  Fax: [phone]  Follow Up Time:

## 2024-06-05 DIAGNOSIS — G44.86 CERVICOGENIC HEADACHE: ICD-10-CM

## 2024-06-05 DIAGNOSIS — G43.901 MIGRAINE, UNSPECIFIED, NOT INTRACTABLE, WITH STATUS MIGRAINOSUS: ICD-10-CM

## 2024-06-05 DIAGNOSIS — E28.2 POLYCYSTIC OVARIAN SYNDROME: ICD-10-CM

## 2024-06-05 DIAGNOSIS — Z91.018 ALLERGY TO OTHER FOODS: ICD-10-CM
